# Patient Record
(demographics unavailable — no encounter records)

---

## 2024-11-01 NOTE — DISCUSSION/SUMMARY
[FreeTextEntry1] : 14 yr old F with b/l impacted cerumen, decreased hearing on R side with pain.   Plan: - Debrox once daily for 1-2 weeks - Ofloxacin once daily for 5-7 days - Return as needed for no improvement

## 2024-11-01 NOTE — HISTORY OF PRESENT ILLNESS
[de-identified] : RIGHT EAR CLOGGED [FreeTextEntry6] : 14 yr old F presenting for 1 day of right ear symptoms. Woke up this morning feeling sensation of right ear being clogged. Now with some pain, and endorses hearing is muffled on that side. Denies recent illness or swimming.

## 2024-11-01 NOTE — HISTORY OF PRESENT ILLNESS
[de-identified] : RIGHT EAR CLOGGED [FreeTextEntry6] : 14 yr old F presenting for 1 day of right ear symptoms. Woke up this morning feeling sensation of right ear being clogged. Now with some pain, and endorses hearing is muffled on that side. Denies recent illness or swimming.

## 2024-11-01 NOTE — PHYSICAL EXAM
[Cerumen in canal] : cerumen in canal [Bilateral] : (bilateral) [NL] : warm, clear [Tired appearing] : not tired appearing [Lethargic] : not lethargic [Enlarged Tonsils] : tonsils not enlarged [Wheezing] : no wheezing [Crackles] : no crackles [Transmitted Upper Airway Sounds] : no transmitted upper airway sounds [Tachypnea] : no tachypnea

## 2025-04-09 NOTE — HISTORY OF PRESENT ILLNESS
[de-identified] : SORE THROAT, STUFFY NOSE AND COUGH. [FreeTextEntry6] : 14 yr old F with 4 days of symptoms. +Sore throat, HA, congestion, sneezing, and cough. No fevers, appetite or energy change.  Also with complaint of at least 1 month of hip/knee pain that shoots down the ankle. Can last the whole day. Currently occurring 1-2x a week, before was daily. No known injuries. No warmth or visible swelling, but does have sensation of swelling.

## 2025-04-09 NOTE — DISCUSSION/SUMMARY
[FreeTextEntry1] : 14 year old F with symptoms likely 2/2 viral URI complicated by L AOM . PE and vitals are otherwise wnl. Rapid Strep and Flu negative.   Recommend supportive care including antipyretics, fluids, and humidifier/warm bath, then nasal saline followed by nasal suction. Education provided for signs of respiratory distress and dehydration. Return if symptoms worsen or persist. F/u throat cx Complete 7 days of antibiotic. Provide ibuprofen as needed for pain or fever. If no improvement within 48 hours return for re-evaluation.

## 2025-04-09 NOTE — HISTORY OF PRESENT ILLNESS
[de-identified] : SORE THROAT, STUFFY NOSE AND COUGH. [FreeTextEntry6] : 14 yr old F with 4 days of symptoms. +Sore throat, HA, congestion, sneezing, and cough. No fevers, appetite or energy change.  Also with complaint of at least 1 month of hip/knee pain that shoots down the ankle. Can last the whole day. Currently occurring 1-2x a week, before was daily. No known injuries. No warmth or visible swelling, but does have sensation of swelling.

## 2025-04-09 NOTE — PHYSICAL EXAM
[Tired appearing] : tired appearing [NL] : warm, clear [Lethargic] : not lethargic [Clear] : left tympanic membrane not clear [Erythema] : no erythema [Bulging] : not bulging [Wheezing] : no wheezing [Tachypnea] : no tachypnea